# Patient Record
Sex: MALE | Race: BLACK OR AFRICAN AMERICAN | NOT HISPANIC OR LATINO | Employment: FULL TIME | URBAN - METROPOLITAN AREA
[De-identification: names, ages, dates, MRNs, and addresses within clinical notes are randomized per-mention and may not be internally consistent; named-entity substitution may affect disease eponyms.]

---

## 2022-08-08 ENCOUNTER — OFFICE VISIT (OUTPATIENT)
Dept: URGENT CARE | Facility: CLINIC | Age: 57
End: 2022-08-08
Payer: COMMERCIAL

## 2022-08-08 VITALS
OXYGEN SATURATION: 99 % | TEMPERATURE: 98 F | WEIGHT: 200 LBS | BODY MASS INDEX: 31.39 KG/M2 | HEART RATE: 96 BPM | RESPIRATION RATE: 18 BRPM | HEIGHT: 67 IN | SYSTOLIC BLOOD PRESSURE: 133 MMHG | DIASTOLIC BLOOD PRESSURE: 79 MMHG

## 2022-08-08 DIAGNOSIS — L03.012 PARONYCHIA OF FINGER OF LEFT HAND: Primary | ICD-10-CM

## 2022-08-08 PROCEDURE — 3075F SYST BP GE 130 - 139MM HG: CPT | Mod: CPTII,S$GLB,,

## 2022-08-08 PROCEDURE — 1160F RVW MEDS BY RX/DR IN RCRD: CPT | Mod: CPTII,S$GLB,,

## 2022-08-08 PROCEDURE — 26010 INCISION & DRAINAGE: ICD-10-PCS | Mod: S$GLB,,,

## 2022-08-08 PROCEDURE — 99213 PR OFFICE/OUTPT VISIT, EST, LEVL III, 20-29 MIN: ICD-10-PCS | Mod: 25,S$GLB,,

## 2022-08-08 PROCEDURE — 99213 OFFICE O/P EST LOW 20 MIN: CPT | Mod: 25,S$GLB,,

## 2022-08-08 PROCEDURE — 1159F PR MEDICATION LIST DOCUMENTED IN MEDICAL RECORD: ICD-10-PCS | Mod: CPTII,S$GLB,,

## 2022-08-08 PROCEDURE — 1159F MED LIST DOCD IN RCRD: CPT | Mod: CPTII,S$GLB,,

## 2022-08-08 PROCEDURE — 1160F PR REVIEW ALL MEDS BY PRESCRIBER/CLIN PHARMACIST DOCUMENTED: ICD-10-PCS | Mod: CPTII,S$GLB,,

## 2022-08-08 PROCEDURE — 3078F PR MOST RECENT DIASTOLIC BLOOD PRESSURE < 80 MM HG: ICD-10-PCS | Mod: CPTII,S$GLB,,

## 2022-08-08 PROCEDURE — 3075F PR MOST RECENT SYSTOLIC BLOOD PRESS GE 130-139MM HG: ICD-10-PCS | Mod: CPTII,S$GLB,,

## 2022-08-08 PROCEDURE — 3008F BODY MASS INDEX DOCD: CPT | Mod: CPTII,S$GLB,,

## 2022-08-08 PROCEDURE — 3078F DIAST BP <80 MM HG: CPT | Mod: CPTII,S$GLB,,

## 2022-08-08 PROCEDURE — 26010 DRAINAGE OF FINGER ABSCESS: CPT | Mod: S$GLB,,,

## 2022-08-08 PROCEDURE — 3008F PR BODY MASS INDEX (BMI) DOCUMENTED: ICD-10-PCS | Mod: CPTII,S$GLB,,

## 2022-08-08 RX ORDER — MUPIROCIN 20 MG/G
OINTMENT TOPICAL 2 TIMES DAILY
Qty: 30 G | Refills: 0 | Status: SHIPPED | OUTPATIENT
Start: 2022-08-08 | End: 2022-08-15

## 2022-08-08 NOTE — PROCEDURES
Incision & Drainage    Date/Time: 8/8/2022 5:15 PM  Performed by: Lolita Macias PA-C  Authorized by: Lolita Macias PA-C     Consent Done?:  Yes (Verbal)    Type:  Abscess  Body area:  Upper extremity  Location details:  Left index finger  Scalpel size:  11  Drainage:  Purulent  Drainage amount:  Moderate  Wound treatment:  Incision and expression of material  Patient tolerance:  Patient tolerated the procedure well with no immediate complications

## 2022-08-08 NOTE — PATIENT INSTRUCTIONS
Abscess/Cellulitis   If your condition worsens or fails to improve we recommend that you receive another evaluation at the ER immediately or contact your PCP to discuss your concerns or return here. You must understand that you've received an urgent care treatment only and that you may be released before all your medical problems are known or treated. You the patient will arrange for follow-up care as instructed.       Soak affected in warm water with epsom salts several times a day. Dilute 2 cups per gallon of water. If you cannot soak, apply warm compresses to the affected area for 20 min, 3-5 times per day and apply warm compresses frequently. If you cannot soak, use the shower head.   Avoid picking or manipulating the wound.     Clean the wound twice a day and put the antibiotic ointment on it if prescribed.         Tylenol or ibuprofen can also be used as directed for pain unless you have an allergy to them or medical condition such as stomach ulcers, kidney or liver disease or blood thinners etc for which you should not be taking these type of medications.         You should seek ER treatment if fever, increase pain, swelling or other signs of increasing infection.     Please follow up with your primary care doctor or specialist as needed.

## 2022-08-08 NOTE — PROGRESS NOTES
"Subjective:       Patient ID: Shaina Peck is a 57 y.o. male.    Vitals:  height is 5' 7" (1.702 m) and weight is 90.7 kg (200 lb). His temperature is 97.8 °F (36.6 °C). His blood pressure is 133/79 and his pulse is 96. His respiration is 18 and oxygen saturation is 99%.     Chief Complaint: Finger Injury (Left Index Finger)    Patient presents with pain and swelling around the cuticle of his left index finger. States it has been there for the last week and a half. Denies known injury or trauma. States he does bite his nails/cuticles every now and then.    Hand Injury   His dominant hand is their left hand. The incident occurred more than 1 week ago. Incident location: unknown. The injury mechanism is unknown. The pain is present in the left fingers. Quality: throbbing. The pain does not radiate. The pain is at a severity of 9/10. The pain is severe. The pain has been constant since the incident. Pertinent negatives include no chest pain, muscle weakness, numbness or tingling. The symptoms are aggravated by movement, palpation and lifting. He has tried ice for the symptoms. The treatment provided no relief.       Cardiovascular: Negative for chest pain.   Skin: Positive for abscess.   Neurological: Negative for numbness and tingling.       Objective:      Physical Exam   Constitutional:  Non-toxic appearance. He does not appear ill. No distress.   HENT:   Head: Normocephalic and atraumatic.   Ears:   Right Ear: External ear normal.   Left Ear: External ear normal.   Eyes: Conjunctivae are normal.   Cardiovascular: Normal rate.   Pulmonary/Chest: Effort normal.   Abdominal: Normal appearance.   Musculoskeletal:        Hands:    Neurological: He is alert.   Skin: Skin is warm and dry.         Comments: Paronychia of left index finger         Assessment:       1. Paronychia of finger of left hand          Plan:         I&D in clinic. Discussed at home treatment for symptomatic relief. RTC and ED precautions " discussed.    Incision & Drainage    Date/Time: 8/8/2022 5:15 PM  Performed by: Lolita Macias PA-C  Authorized by: Lolita Macias PA-C     Consent Done?:  Yes (Verbal)    Type:  Abscess  Body area:  Upper extremity  Location details:  Left index finger  Scalpel size:  11  Drainage:  Purulent  Drainage amount:  Moderate  Wound treatment:  Incision and expression of material  Patient tolerance:  Patient tolerated the procedure well with no immediate complications          Paronychia of finger of left hand  -     mupirocin (BACTROBAN) 2 % ointment; Apply topically 2 (two) times daily. for 7 days  Dispense: 30 g; Refill: 0  -     Incision & Drainage         Patient Instructions                                              Abscess/Cellulitis   If your condition worsens or fails to improve we recommend that you receive another evaluation at the ER immediately or contact your PCP to discuss your concerns or return here. You must understand that you've received an urgent care treatment only and that you may be released before all your medical problems are known or treated. You the patient will arrange for follow-up care as instructed.       Soak affected in warm water with epsom salts several times a day. Dilute 2 cups per gallon of water. If you cannot soak, apply warm compresses to the affected area for 20 min, 3-5 times per day and apply warm compresses frequently. If you cannot soak, use the shower head.   Avoid picking or manipulating the wound.     Clean the wound twice a day and put the antibiotic ointment on it if prescribed.         Tylenol or ibuprofen can also be used as directed for pain unless you have an allergy to them or medical condition such as stomach ulcers, kidney or liver disease or blood thinners etc for which you should not be taking these type of medications.         You should seek ER treatment if fever, increase pain, swelling or other signs of increasing infection.     Please follow up  with your primary care doctor or specialist as needed.